# Patient Record
Sex: MALE | Race: WHITE
[De-identification: names, ages, dates, MRNs, and addresses within clinical notes are randomized per-mention and may not be internally consistent; named-entity substitution may affect disease eponyms.]

---

## 2019-01-23 ENCOUNTER — HOSPITAL ENCOUNTER (EMERGENCY)
Dept: HOSPITAL 41 - JD.ED | Age: 49
Discharge: HOME | End: 2019-01-23
Payer: COMMERCIAL

## 2019-01-23 VITALS — DIASTOLIC BLOOD PRESSURE: 101 MMHG | SYSTOLIC BLOOD PRESSURE: 151 MMHG

## 2019-01-23 DIAGNOSIS — S61.411A: ICD-10-CM

## 2019-01-23 DIAGNOSIS — Z91.040: ICD-10-CM
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
## 2019-01-23 DIAGNOSIS — S66.124A: Primary | ICD-10-CM

## 2019-01-23 DIAGNOSIS — Z98.890: ICD-10-CM

## 2019-01-23 DIAGNOSIS — S66.126A: ICD-10-CM

## 2019-01-23 DIAGNOSIS — Z88.1: ICD-10-CM

## 2019-01-23 PROCEDURE — 99283 EMERGENCY DEPT VISIT LOW MDM: CPT

## 2019-01-23 PROCEDURE — 29125 APPL SHORT ARM SPLINT STATIC: CPT

## 2019-01-23 PROCEDURE — 12002 RPR S/N/AX/GEN/TRNK2.6-7.5CM: CPT

## 2019-01-23 NOTE — EDM.PDOC
ED HPI GENERAL MEDICAL PROBLEM





- General


Chief Complaint: Laceration


Stated Complaint: HAND LACERATION


Time Seen by Provider: 01/23/19 20:41


Source of Information: Reports: Patient, Family


History Limitations: Reports: No Limitations





- History of Present Illness


INITIAL COMMENTS - FREE TEXT/NARRATIVE: 





The patient presents with a laceration to the right hand.  He had a roast in 

the palm of his right hand and he was cutting it and it cut the palm of his 

right hand.  He cannot flex his right 4th and 5th fingers.   He is right 

handed.  The laceration is about 6cm.  His tetanus is up to date.  A couple 

years ago he had an infection in his right 5th finger.  He says it was MRSA.  

He needed infusions at the hospital.


Onset: Sudden


Duration: Minutes:


Location: Reports: Upper Extremity, Right (hand)


Quality: Reports: Sharp


Severity: Moderate


Improves with: Reports: Immobilization


Worsens with: Reports: Movement


Context: Reports: Trauma (He cut his hand with a knife)


Associated Symptoms: Reports: No Other Symptoms





- Related Data


 Allergies











Allergy/AdvReac Type Severity Reaction Status Date / Time


 


amoxicillin Allergy  Cannot Verified 01/23/19 20:41





   Remember  


 


latex Allergy  Cannot Verified 01/23/19 20:41





   Remember  











Home Meds: 


 Home Meds





Doxycycline [Vibramycin] 100 mg PO BID #20 cap 01/23/19 [Rx]











Past Medical History





- Past Surgical History


HEENT Surgical History: Reports: Tonsillectomy





Social & Family History





- Tobacco Use


Smoking Status *Q: Never Smoker


Second Hand Smoke Exposure: No





- Caffeine Use


Caffeine Use: Reports: None





- Recreational Drug Use


Recreational Drug Use: No





ED ROS GENERAL





- Review of Systems


Review Of Systems: See Below


Constitutional: Reports: No Symptoms


HEENT: Reports: No Symptoms


Respiratory: Reports: No Symptoms


Cardiovascular: Reports: No Symptoms


Endocrine: Reports: No Symptoms


GI/Abdominal: Reports: No Symptoms


: Reports: No Symptoms


Musculoskeletal: Reports: Other (6cm laceration to the palm of his right hand)





ED EXAM, SKIN/RASH


Exam: See Below


Exam Limited By: No Limitations


General Appearance: Alert, No Apparent Distress


Ears: Normal External Exam


Nose: Normal Inspection


Head: Atraumatic, Normocephalic


Neck: Normal Inspection


Respiratory/Chest: No Respiratory Distress


Extremities: Other (6cm laceration to the palm of his hand with lacerated 

flexor tendons of the 4th and 5th digits.  He still has good sensation and 

capillary refill.)





ED SKIN PROCEDURES





- Laceration/Wound Repair


  ** Right Hand


Lac/Wound length In cm: 6


Appearance: Subcutaneous, Linear


Distal NVT: Neuro & Vascular Intact, Other (flexer tendon laceration )


Anesthetic Type: Local


Local Anesthesia - Lidocaine (Xylocaine): 1% with EPI


Skin Prep: Saline


Exploration/Debridement/Repair: Wound Explored, In a Bloodless Field, Explored 

to Base


Closed with: Sutures


Suture Size: 4-0


# of Sutures: 7


Suture Type: Nylon, Interrupted, Simple


Tetanus Status Addressed: Yes


Complications: No





- Splinting


  ** Right Upper Extremity


Splint Site: right


Pre-Procedure NV Status: Normal


Post-Procedure NV Status: Normal


Splint Material: Fiberglass


Splint Design: Gutter


Applied & Form Fitted By: Provider


Provider Post-Splint Application NV Check: NV Status Normal, Good Position


Complications: No





Course





- Vital Signs


Last Recorded V/S: 





 Last Vital Signs











Temp  98.2 F   01/23/19 20:39


 


Pulse  60   01/23/19 20:39


 


Resp  18   01/23/19 20:39


 


BP  151/101 H  01/23/19 20:39


 


Pulse Ox  100   01/23/19 20:39














- Orders/Labs/Meds


Meds: 





Medications














Discontinued Medications














Generic Name Dose Route Start Last Admin





  Trade Name Freq  PRN Reason Stop Dose Admin


 


Ceftriaxone Sodium 1 gm/  0 gm  01/23/19 21:15  





  Lidocaine HCl 2.1 ml  IM   





  Q24H [facility]   





     





     





     





     


 


Doxycycline Hyclate  100 mg  01/23/19 21:03  01/23/19 21:09





  Vibramycin  PO  01/23/19 21:04  100 mg





  ONETIME ONE   Administration





     





     





     





     


 


Lidocaine/Epinephrine  20 ml  01/23/19 21:02  01/23/19 21:09





  Xylocaine 1% With Epinephrine 1:100,000  INJECT  01/23/19 21:03  20 ml





  ONETIME ONE   Administration





     





     





     





     














- Re-Assessments/Exams


Free Text/Narrative Re-Assessment/Exam: 





01/23/19 21:59


The patient has a flexor tendons laceration to the 4th and 5th fingers at the 

palm of his hand.  I called Dr Guajardo in [place] and he wanted me to close 

the wound, give him antibiotics and splint him.  Someone from his office will 

call the patient tomorrow for possible surgery on Friday.  I gave the patient 

doxycycline because he was MRSA positive for a wound on that hand in the 5th 

finger.  I will give him a prescription for more.





Departure





- Departure


Time of Disposition: 22:00


Disposition: Home, Self-Care 01


Condition: Good


Clinical Impression: 


Laceration of right hand


Qualifiers:


 Encounter type: initial encounter Foreign body presence: without foreign body 

Qualified Code(s): S61.411A - Laceration without foreign body of right hand, 

initial encounter





Laceration of flexor tendon of right hand


Qualifiers:


 Encounter type: initial encounter Qualified Code(s): S66.821A - Laceration of 

other specified muscles, fascia and tendons at wrist and hand level, right hand

, initial encounter








- Discharge Information


*PRESCRIPTION DRUG MONITORING PROGRAM REVIEWED*: No


*COPY OF PRESCRIPTION DRUG MONITORING REPORT IN PATIENT ADDY: No


Prescriptions: 


Doxycycline [Vibramycin] 100 mg PO BID #20 cap


Referrals: 


PCP,None [Primary Care Provider] - 


Vin Guajardo MD [Consulting Physician] - 2 Days


Forms:  ED Department Discharge, ED Return to Work/School Form


Additional Instructions: 


Someone from [facility] will call you tomorrow.  If you do not hear from 

them by noon, call them.  Keep the splint on until you are seen.  Take the 

doxycycline 2 times per day for 10 days.  Please return if you are worse.

## 2025-07-06 ENCOUNTER — HOSPITAL ENCOUNTER (EMERGENCY)
Dept: HOSPITAL 41 - JD.ED | Age: 55
LOS: 1 days | Discharge: HOME | End: 2025-07-07
Payer: COMMERCIAL

## 2025-07-06 VITALS — HEART RATE: 80 BPM

## 2025-07-06 DIAGNOSIS — Z79.899: ICD-10-CM

## 2025-07-06 DIAGNOSIS — Z88.0: ICD-10-CM

## 2025-07-06 DIAGNOSIS — R29.898: Primary | ICD-10-CM

## 2025-07-06 DIAGNOSIS — F10.10: ICD-10-CM

## 2025-07-06 LAB
ALBUMIN SERPL-MCNC: 3.4 G/DL (ref 3.4–5)
ALBUMIN/GLOB SERPL: 0.9 {RATIO} (ref 1–2)
ALP SERPL-CCNC: 83 U/L (ref 46–116)
ALT SERPL-CCNC: 32 U/L (ref 16–63)
ANION GAP SERPL CALC-SCNC: 12.3 MMOL/L (ref 5–15)
APAP SERPL-MCNC: 0 UG/ML (ref 10–30)
AST SERPL-CCNC: 26 U/L (ref 15–37)
BASOPHILS # BLD AUTO: 0.1 K/MM3 (ref 0–0.2)
BASOPHILS NFR BLD AUTO: 0.9 % (ref 0–1)
BILIRUB SERPL-MCNC: 0.4 MG/DL (ref 0.2–1)
BUN SERPL-MCNC: 8 MG/DL (ref 7–18)
BUN/CREAT SERPL: 10 (ref 14–18)
CALCIUM SERPL-MCNC: 8.5 MG/DL (ref 8.5–10.1)
CHLORIDE SERPL-SCNC: 101 MEQ/L (ref 98–107)
CO2 SERPL-SCNC: 27 MEQ/L (ref 21–32)
CREAT CL 24H UR+SERPL-VRATE: 93.04 ML/MIN
CREAT SERPL-MCNC: 0.8 MG/DL (ref 0.7–1.3)
EGFRCR SERPLBLD CKD-EPI 2021: 104 ML/MIN (ref 60–?)
EOSINOPHIL # BLD AUTO: 0.4 K/MM3 (ref 0–0.4)
EOSINOPHIL NFR BLD AUTO: 8 % (ref 0–6)
ETHANOL BLD-MCNC: 0.26 GM%
GLOBULIN SER-MCNC: 3.6 GM/DL
GLUCOSE SERPL-MCNC: 100 MG/DL (ref 70–99)
HCT VFR BLD AUTO: 42.7 % (ref 42–52)
HGB BLD-MCNC: 14.6 GM/DL (ref 14–18)
IMM GRANULOCYTES # BLD: 0.01 K/MM3 (ref 0–0.05)
IMM GRANULOCYTES NFR BLD: 0.2 % (ref 0–0.4)
LYMPHOCYTES # BLD AUTO: 1.3 K/MM3 (ref 1–4.8)
LYMPHOCYTES NFR BLD AUTO: 24.5 % (ref 24–44)
MAGNESIUM SERPL-MCNC: 2.1 MG/DL (ref 1.8–2.4)
MCH RBC QN AUTO: 30.9 PG (ref 28–32)
MCHC RBC AUTO-ENTMCNC: 34.2 G/DL (ref 32–36)
MCHC RBC AUTO-ENTMCNC: 90.5 FL (ref 83–99)
MONOCYTES # BLD AUTO: 0.5 K/MM3 (ref 0–0.8)
MONOCYTES NFR BLD AUTO: 9.3 % (ref 0–8)
NEUTROPHILS # BLD AUTO: 3.1 K/MM3 (ref 1.8–7.7)
NEUTROPHILS NFR BLD AUTO: 57.1 % (ref 41–71)
NRBC BLD AUTO-RTO: 0 % (ref 0–0.02)
NRBC BLD AUTO-RTO: 0 % (ref 0–0.2)
PATH REV BLD -IMP: (no result)
PLATELET # BLD AUTO: 169 K/MM3 (ref 150–400)
PMV BLD AUTO: 9.4 FL (ref 9.4–12.4)
POTASSIUM SERPL-SCNC: 3.3 MEQ/L (ref 3.5–5.1)
PROT SERPL-MCNC: 7 G/DL (ref 6.4–8.2)
RBC # BLD AUTO: 4.72 M/MM3 (ref 4.52–5.9)
SODIUM SERPL-SCNC: 137 MEQ/L (ref 136–145)
TSH SERPL DL<=0.005 MIU/L-ACNC: 4.51 UIU/ML (ref 0.36–3.74)
WBC # BLD AUTO: 5.39 K/MM3 (ref 3.9–11.3)

## 2025-07-06 PROCEDURE — 93005 ELECTROCARDIOGRAM TRACING: CPT

## 2025-07-06 PROCEDURE — 85025 COMPLETE CBC W/AUTO DIFF WBC: CPT

## 2025-07-06 PROCEDURE — 70450 CT HEAD/BRAIN W/O DYE: CPT

## 2025-07-06 PROCEDURE — 83735 ASSAY OF MAGNESIUM: CPT

## 2025-07-06 PROCEDURE — 80143 DRUG ASSAY ACETAMINOPHEN: CPT

## 2025-07-06 PROCEDURE — 96360 HYDRATION IV INFUSION INIT: CPT

## 2025-07-06 PROCEDURE — 36415 COLL VENOUS BLD VENIPUNCTURE: CPT

## 2025-07-06 PROCEDURE — 84443 ASSAY THYROID STIM HORMONE: CPT

## 2025-07-06 PROCEDURE — 80179 DRUG ASSAY SALICYLATE: CPT

## 2025-07-06 PROCEDURE — 99285 EMERGENCY DEPT VISIT HI MDM: CPT

## 2025-07-06 PROCEDURE — 80053 COMPREHEN METABOLIC PANEL: CPT

## 2025-07-06 PROCEDURE — 80307 DRUG TEST PRSMV CHEM ANLYZR: CPT

## 2025-07-06 PROCEDURE — 70496 CT ANGIOGRAPHY HEAD: CPT

## 2025-07-06 PROCEDURE — 84439 ASSAY OF FREE THYROXINE: CPT

## 2025-07-06 PROCEDURE — 70498 CT ANGIOGRAPHY NECK: CPT

## 2025-07-06 RX ADMIN — SODIUM CHLORIDE ONE MLS/HR: 0.9 INJECTION, SOLUTION INTRAVENOUS at 23:00

## 2025-07-06 RX ADMIN — IOPAMIDOL ONE ML: 755 INJECTION, SOLUTION INTRAVENOUS at 23:38

## 2025-07-06 RX ADMIN — Medication PRN ML: at 23:38

## 2025-07-06 RX ADMIN — SODIUM CHLORIDE SCH MLS/HR: 9 INJECTION, SOLUTION INTRAVENOUS at 23:38

## 2025-07-07 VITALS — SYSTOLIC BLOOD PRESSURE: 118 MMHG | DIASTOLIC BLOOD PRESSURE: 85 MMHG

## 2025-07-07 LAB — T4 FREE SERPL-MCNC: 0.67 NG/DL (ref 0.76–1.46)
